# Patient Record
Sex: FEMALE | Race: WHITE
[De-identification: names, ages, dates, MRNs, and addresses within clinical notes are randomized per-mention and may not be internally consistent; named-entity substitution may affect disease eponyms.]

---

## 2016-11-16 VITALS — DIASTOLIC BLOOD PRESSURE: 95 MMHG | HEART RATE: 89 BPM | SYSTOLIC BLOOD PRESSURE: 165 MMHG

## 2017-05-13 ENCOUNTER — HOSPITAL ENCOUNTER (INPATIENT)
Dept: HOSPITAL 45 - C.EDB | Age: 77
LOS: 1 days | Discharge: HOME | DRG: 392 | End: 2017-05-14
Attending: HOSPITALIST | Admitting: HOSPITALIST
Payer: COMMERCIAL

## 2017-05-13 VITALS — HEART RATE: 80 BPM | SYSTOLIC BLOOD PRESSURE: 124 MMHG | DIASTOLIC BLOOD PRESSURE: 72 MMHG

## 2017-05-13 VITALS
HEIGHT: 61 IN | BODY MASS INDEX: 35.71 KG/M2 | HEIGHT: 61 IN | BODY MASS INDEX: 35.71 KG/M2 | WEIGHT: 189.16 LBS | WEIGHT: 189.16 LBS

## 2017-05-13 VITALS
DIASTOLIC BLOOD PRESSURE: 78 MMHG | TEMPERATURE: 99.32 F | HEART RATE: 90 BPM | OXYGEN SATURATION: 94 % | SYSTOLIC BLOOD PRESSURE: 156 MMHG

## 2017-05-13 DIAGNOSIS — N28.89: ICD-10-CM

## 2017-05-13 DIAGNOSIS — A08.4: Primary | ICD-10-CM

## 2017-05-13 DIAGNOSIS — Z79.899: ICD-10-CM

## 2017-05-13 DIAGNOSIS — E83.42: ICD-10-CM

## 2017-05-13 DIAGNOSIS — K21.9: ICD-10-CM

## 2017-05-13 DIAGNOSIS — I12.9: ICD-10-CM

## 2017-05-13 DIAGNOSIS — E86.0: ICD-10-CM

## 2017-05-13 DIAGNOSIS — M19.90: ICD-10-CM

## 2017-05-13 DIAGNOSIS — N18.3: ICD-10-CM

## 2017-05-13 DIAGNOSIS — H92.02: ICD-10-CM

## 2017-05-13 LAB
ALP SERPL-CCNC: 80 U/L (ref 45–117)
ALT SERPL-CCNC: 24 U/L (ref 12–78)
ANION GAP SERPL CALC-SCNC: 10 MMOL/L (ref 3–11)
ANION GAP SERPL CALC-SCNC: 21 MMOL/L (ref 16–25)
APPEARANCE UR: CLEAR
AST SERPL-CCNC: 16 U/L (ref 15–37)
BASOPHILS # BLD: 0 K/UL (ref 0–0.2)
BASOPHILS NFR BLD: 0 %
BILIRUB UR-MCNC: (no result) MG/DL
BUN SERPL-MCNC: 20 MG/DL (ref 7–18)
BUN/CREAT SERPL: 15 (ref 10–20)
CA-I BLD-SCNC: 1.18 MMOL/L (ref 1.12–1.32)
CALCIUM SERPL-MCNC: 8.8 MG/DL (ref 8.5–10.1)
CHLORIDE BLD-SCNC: 106 MEQ/L (ref 101–112)
CHLORIDE SERPL-SCNC: 109 MMOL/L (ref 98–107)
CKMB/CK RATIO: 3.3 (ref 0–3)
CO2 SERPL-SCNC: 24 MMOL/L (ref 21–32)
COLOR UR: YELLOW
COMPLETE: YES
CREAT BLD-MCNC: 1.2 MG/DL (ref 0.6–1.3)
CREAT CL PREDICTED SERPL C-G-VRATE: 37 ML/MIN
CREAT SERPL-MCNC: 1.3 MG/DL (ref 0.6–1.2)
EOSINOPHIL NFR BLD AUTO: 165 K/UL (ref 130–400)
GLUCOSE SERPL-MCNC: 141 MG/DL (ref 70–99)
HCT VFR BLD AUTO: 42 % (ref 37–47)
HCT VFR BLD CALC: 42.8 % (ref 37–47)
HGB BLD-MCNC: 14.3 G/DL (ref 12–16)
IG%: 0.2 %
IMM GRANULOCYTES NFR BLD AUTO: 7.5 %
INR PPP: 1 (ref 0.9–1.1)
ISTAT CARBON DIOXIDE: 21 MEQ/L (ref 24–31)
LYMPHOCYTES # BLD: 0.72 K/UL (ref 1.2–3.4)
MAGNESIUM SERPL-MCNC: 1.6 MG/DL (ref 1.8–2.4)
MANUAL MICROSCOPIC REQUIRED?: NO
MCH RBC QN AUTO: 29.8 PG (ref 25–34)
MCHC RBC AUTO-ENTMCNC: 34.3 G/DL (ref 32–36)
MCV RBC AUTO: 86.8 FL (ref 80–100)
MONOCYTES NFR BLD: 6.8 %
NEUTROPHILS # BLD AUTO: 0.1 %
NEUTROPHILS NFR BLD AUTO: 85.4 %
NITRITE UR QL STRIP: (no result)
PH UR STRIP: 5.5 [PH] (ref 4.5–7.5)
PMV BLD AUTO: 10.5 FL (ref 7.4–10.4)
POTASSIUM SERPL-SCNC: 4 MMOL/L (ref 3.5–5.1)
PROTHROMBIN TIME: 10.2 SECONDS (ref 9–12)
RBC # BLD AUTO: 4.93 M/UL (ref 4.2–5.4)
REVIEW REQ?: NO
SODIUM BLD-SCNC: 143 MEQ/L (ref 135–144)
SODIUM SERPL-SCNC: 143 MMOL/L (ref 136–145)
SP GR UR STRIP: 1.03 (ref 1–1.03)
UROBILINOGEN UR-MCNC: (no result) MG/DL
WBC # BLD AUTO: 9.66 K/UL (ref 4.8–10.8)
ZZURINE CULT IF INDIC CATH: NO

## 2017-05-13 RX ADMIN — SODIUM CHLORIDE SCH MLS/HR: 900 INJECTION, SOLUTION INTRAVENOUS at 17:22

## 2017-05-13 RX ADMIN — MAGNESIUM SULFATE IN DEXTROSE SCH GM: 10 INJECTION, SOLUTION INTRAVENOUS at 18:13

## 2017-05-13 RX ADMIN — Medication SCH MG: at 19:55

## 2017-05-13 RX ADMIN — MAGNESIUM SULFATE IN DEXTROSE SCH GM: 10 INJECTION, SOLUTION INTRAVENOUS at 16:15

## 2017-05-13 RX ADMIN — METRONIDAZOLE SCH MLS/HR: 500 INJECTION, SOLUTION INTRAVENOUS at 18:10

## 2017-05-13 NOTE — DIAGNOSTIC IMAGING REPORT
ABDOMEN AND PELVIS CT WITH IV CONTRAST



CT DOSE: 782.00 mGy.cm



HISTORY:      Left lower quadrant abdominal pain.



TECHNIQUE: Multiaxial CT images of the abdomen and pelvis were performed

following the use of intravenous contrast.



COMPARISON STUDY: Abdomen and pelvis CT 11/3/2013.



FINDINGS: Linear density lung bases suggestive of subsegmental atelectasis.

There are also a few groundglass densities within the lung bases which may be

due to mild air trapping. No pneumoperitoneum. No pneumatosis. The liver,

spleen, adrenal glands, and pancreas are unremarkable. No retroperitoneal

lymphadenopathy. There are few bilateral renal hypodense lesions. Majority of

these favor cysts. However there is a 13 mm heterogeneous lesion within the left

kidney on image 157 which does not clearly represent a simple cyst. This may

demonstrate enhancement and is concerning for a solid renal mass. Normal

gallbladder. The uterus, bilateral adnexa, and bladder are unremarkable. Colonic

diverticulosis. The colon and small bowel are decompressed which results in

suboptimal evaluation. Normal appendix. No bowel obstruction. Questionable

thickening and engorgement of the mesenteric vessels within the small bowel

loops most pronounced within the right lower quadrant. However, this could be

due to the underdistention.



IMPRESSION: 



1. No evidence for bowel obstruction. Question wall thickening within the small

bowel loops within the right side of the abdomen and lower quadrant could be due

to underdistention. However, a low-grade enteritis could also have a similar

appearance. 

2. Colonic diverticulosis.

3. Normal appendix.

4. A 13 mm lesion within the left kidney which is concerning for a solid renal

mass. Dedicated nonemergent renal CT or renal MRI is recommended for further

evaluation. 







Electronically signed by:  Sterling Kasper M.D.

5/13/2017 1:36 PM



Dictated Date/Time:  5/13/2017 1:25 PM

## 2017-05-13 NOTE — HISTORY AND PHYSICAL
History & Physical


Date & Time of Service:


May 13, 2017 at 16:00


Chief Complaint:


Abd Pain, Cold, Chills, N/V/D


Primary Care Physician:


Adilia Byrd M.D.


History of Present Illness


Source:  patient, family (daughter in law at bedside), clinic records


This is a 76 year old female with PMH of HTN, CKD stage III, breast CA s/p 

surgery and radiation, GERD, and other problems listed below who presents to 

the ED for gastrointestinal symptoms starting overnight. Patient states at 4 am 

this morning she developed periumbilical abdominal pain, bloating, nausea, 

vomiting, diarrhea, chills, fever of 101.8 at home. Currently pain is resolved 

but still feels bloated. Pt reports intermittent bloating in the past which 

initially improved when she was started on Prilosec 2 weeks ago. She was 

feeling well yesterday. Had pork and sauerkraut last evening. Pt reports 3 

month history of intermittent left ear discomfort with decreased hearing for 

which she has an ENT appointment later this month. The ear is not bothering her 

currently. Denies weight loss, URI symptoms, cough, SOB, chest pain, swallowing 

difficulty, reflux, GI bleeding, dysuria, frequency, urgency, calf pain, edema. 

No recent travel, sick contact, or recent antibiotics. Patient admits to 

history of left sided kidney mass for several years.





Past Medical/Surgical History


Medical Problems:


(1) Breast cancer


Permanent Comment: Status post abnormal left breast mammogram


Status post biopsy


Status post lumpectomy and sentinel lymph node biopsy 01/08/2013


Stage [id number]


Estrogen receptor negative, progesterone receptor positive, and HER-2/yohannes 

negative


Status post completion of radiation therapy 05/06/2013 received 5930 cGy





Status: Resolved  





(2) GERD (gastroesophageal reflux disease)


Status: Chronic  





(3) HTN (hypertension)


Status: Chronic  





(4) Osteoarthritis


Status: Chronic  





(5) PUD (peptic ulcer disease)


Permanent Comment: hx duodenal ulcer


Status: Chronic  





(6) Pyelonephritis


Status: Resolved  





(7) Stage I breast cancer


Permanent Comment: Abnormal right breast mammogram


Status post biopsy followed by lumpectomy and sentinel lymph node biopsy


Stage [id number]


Estrogen receptor negative progesterone receptor negative HER-2/yohannes negative


Status post completion of radiation therapy 04/22/2015 received 6120 cGy


Status: Resolved  





Surgical Problems:


(1) H/O colonoscopy


Permanent Comment: 8/2013- diverticulosis


Status: Chronic  





(2) H/O esophagogastroduodenoscopy


Permanent Comment: 3/25/2008- normal


8/2013-small hiatal hernia


Status: Chronic  





(3) History of carpal tunnel surgery


Status: Chronic  





(4) History of lumpectomy of left breast


Status: Chronic  





(5) S/P tubal ligation


Status: Chronic  





(6) Status post hysteroscopic polypectomy


Status: Chronic  





(7) Status post partial mastectomy of right breast


Status: Chronic  

















Family History





Diabetes mellitus


FHx: cancer


FHx: heart disease


Hypertension





Social History


Smoking Status:  Never Smoker


Alcohol Use:  occasionally (rare )


Drug Use:  none


Marital Status:  single


Housing status:  lives alone


Occupational Status:  retired





Multi-Drug Resistant Organisms


History of MDRO:  No





Allergies


Coded Allergies:  


     Ciprofloxacin (Verified  Allergy, Intermediate, HIVES, 5/13/17)


     Penicillins (Unverified  Allergy, Mild, RASH, 5/13/17)


Uncoded Allergies:  


     dermabond tape (Allergy, Severe, rash severe skin breakdown, 2/7/13)





Home Medications


Scheduled


Amlodipine Besylate (Norvasc), 10 MG PO DAILY


Calcium Carbonate (Tums), 1,000 MG PO DAILY


Cholecalciferol (D-5000), 1 TAB PO DAILY


Fish Oil ([place]-3), 1 CAP PO DAILY


Fluticasone Propionate (Nasal) (Flonase Allergy Relief), 2 SPRAYS MARY DAILY


Magnesium Oxide (Magnesium), 500 MG PO DAILY


Omeprazole (Prilosec), 20 MG PO DAILY


Turmeric (Curcuma Longa) (Turmeric), 1,500 MG PO DAILY


Vitamin B Complex (Vitamin B Complex), 1 TAB PO DAILY





Scheduled PRN


Lorazepam (Ativan), 0.5-1 MG PO HS PRN for Sleep


Oxymetazoline Hcl (Afrin), 2 SPRAYS MARY BID PRN for Nasal Congestion


Tramadol (Ultram), 50 MG PO Q6 PRN for Pain





Review of Systems


Ten systems reviewed and negative except as noted in HPI.





Physical Exam


Vital Signs











  Date Time  Temp Pulse Resp B/P Pulse Ox O2 Delivery O2 Flow Rate FiO2


 


5/13/17 15:42 37.7 88 16 121/65 94 Room Air  


 


5/13/17 14:16  92 18 135/63 96 Room Air  


 


5/13/17 13:28  89 18 185/81 96 Room Air  


 


5/13/17 12:27 38.8 116 20 182/87 97 Room Air  








General Appearance:  WD/WN, no apparent distress, + pertinent finding (pleasant 

alert 76 year old female, daughter in law at bedside)


Head:  normocephalic, atraumatic


Eyes:  normal inspection, PERRL, EOMI


ENT:  pharynx normal, + pertinent finding (left TM mild erythema inferiorly. no 

bulging. right TM normal. canals normal.)


Neck:  supple, trachea midline


Respiratory/Chest:  lungs clear, normal breath sounds, no respiratory distress, 

no accessory muscle use


Cardiovascular:  regular rate, rhythm, no murmur


Abdomen/GI:  normal bowel sounds, soft, + pertinent finding (mildly tender in 

periumbilical area, no guarding)


Extremities/Musculoskelatal:  no calf tenderness, no pedal edema


Neurologic/Psych:  alert, normal mood/affect, oriented x 3


Skin:  normal color, warm/dry





Diagnostics


Laboratory Results





Results Past 24 Hours








Test


  5/13/17


12:40 5/13/17


12:54 5/13/17


12:57 5/13/17


13:44 Range/Units


 


 


White Blood Count 9.66    4.8-10.8  K/uL


 


Red Blood Count 4.93    4.2-5.4  M/uL


 


Hemoglobin 14.7    12.0-16.0  g/dL


 


Hematocrit 42.8    37-47  %


 


Mean Corpuscular Volume 86.8      fL


 


Mean Corpuscular Hemoglobin 29.8    25-34  pg


 


Mean Corpuscular Hemoglobin


Concent 34.3


  


  


  


  32-36  g/dl


 


 


Platelet Count 165    130-400  K/uL


 


Mean Platelet Volume 10.5    7.4-10.4  fL


 


Neutrophils (%) (Auto) 85.4     %


 


Lymphocytes (%) (Auto) 7.5     %


 


Monocytes (%) (Auto) 6.8     %


 


Eosinophils (%) (Auto) 0.1     %


 


Basophils (%) (Auto) 0.0     %


 


Neutrophils # (Auto) 8.25    1.4-6.5  K/uL


 


Lymphocytes # (Auto) 0.72    1.2-3.4  K/uL


 


Monocytes # (Auto) 0.66    0.11-0.59  K/uL


 


Eosinophils # (Auto) 0.01    0-0.5  K/uL


 


Basophils # (Auto) 0.00    0-0.2  K/uL


 


RDW Standard Deviation 44.8    36.4-46.3  fL


 


RDW Coefficient of Variation 14.1    11.5-14.5  %


 


Immature Granulocyte % (Auto) 0.2     %


 


Immature Granulocyte # (Auto) 0.02    0.00-0.02  K/uL


 


Prothrombin Time


  10.2


  


  


  


  9.0-12.0


SECONDS


 


Prothromb Time International


Ratio 1.0


  


  


  


  0.9-1.1  


 


 


Sodium Level 143    136-145  mmol/L


 


Potassium Level 4.0    3.5-5.1  mmol/L


 


Chloride Level 109      mmol/L


 


Carbon Dioxide Level 24    21-32  mmol/L


 


Anion Gap 10.0  21.0  16-25  mmol/L


 


Blood Urea Nitrogen 20    7-18  mg/dl


 


Creatinine


  1.30


  


  


  


  0.60-1.20


mg/dl


 


Est Creatinine Clear Calc


Drug Dose 37.0


  


  


  


   ml/min


 


 


Estimated GFR (


American) 46.2


  


  


  


   


 


 


Estimated GFR (Non-


American 39.8


  


  


  


   


 


 


BUN/Creatinine Ratio 15.0    10-20  


 


Random Glucose 141    70-99  mg/dl


 


Calcium Level 8.8    8.5-10.1  mg/dl


 


Magnesium Level 1.6    1.8-2.4  mg/dl


 


Total Bilirubin 0.5    0.2-1  mg/dl


 


Direct Bilirubin 0.1    0-0.2  mg/dl


 


Aspartate Amino Transf


(AST/SGOT) 16


  


  


  


  15-37  U/L


 


 


Alanine Aminotransferase


(ALT/SGPT) 24


  


  


  


  12-78  U/L


 


 


Alkaline Phosphatase 80      U/L


 


Total Creatine Kinase 89      U/L


 


Creatine Kinase MB 2.9    0.5-3.6  ng/ml


 


Creatine Kinase MB Ratio 3.3    0-3.0  


 


Troponin I < 0.015    0-0.045  ng/ml


 


Total Protein 7.3    6.4-8.2  gm/dl


 


Albumin 4.1    3.4-5.0  gm/dl


 


Bedside Lactic Acid Venous


  


  1.62


  


  


  0.90-1.70


mmol/L


 


Bedside Hemoglobin   14.3  12.0-16.0  g/dl


 


Bedside Hematocrit   42  37-47  %


 


Bedside Sodium   143  135-144  mEq/L


 


Bedside Potassium   4.0  3.3-5.0  mEq/L


 


Bedside Chloride   106  101-112  mEq/L


 


Bedside Total CO2   21  24-31  mEq/l


 


Bedside Blood Urea Nitrogen   21  7-18  mg/dl


 


Bedside Creatinine   1.2  0.6-1.3  mg/dl


 


Bedside Glucose (other)   146  70-99  mg/dl


 


Bedside Ionized Calcium (Tre)


  


  


  1.18


  


  1.12-1.32


mmol/l


 


Urine Color    YELLOW  


 


Urine Appearance    CLEAR CLEAR  


 


Urine pH    5.5 4.5-7.5  


 


Urine Specific Gravity    1.029 1.000-1.030  


 


Urine Protein    NEG NEG  


 


Urine Glucose (UA)    NEG NEG  


 


Urine Ketones    NEG NEG  


 


Urine Occult Blood    NEG NEG  


 


Urine Nitrite    NEG NEG  


 


Urine Bilirubin    NEG NEG  


 


Urine Urobilinogen    NEG NEG  


 


Urine Leukocyte Esterase    NEG NEG  


 


Urine WBC (Auto)    1-5 0-5  /hpf


 


Urine RBC (Auto)    0-4 0-4  /hpf


 


Urine Hyaline Casts (Auto)    0 0-5  /lpf


 


Urine Epithelial Cells (Auto)    10-20 0-5  /lpf


 


Urine Bacteria (Auto)    NEG NEG  








 Microbiology Results


5/13/17 Blood Culture, Received


          Pending


5/13/17 Blood Culture, Received


          Pending





Diagnostic Radiology


CHEST ONE VIEW PORTABLE





HISTORY:      fever





COMPARISON: Chest 3/27/2013.





FINDINGS: Linear density at the left lung base favor subsegmental atelectasis or


scarring. No focal lung consolidations to suggest pneumonia. No pleural


effusions. No pneumothorax. The heart is mildly enlarged.





IMPRESSION:


Mild cardiomegaly. No focal lung consolidations to suggest pneumonia.





********************************************************************************

************


ABDOMEN AND PELVIS CT WITH IV CONTRAST





CT DOSE: 782.00 mGy.cm





HISTORY:      Left lower quadrant abdominal pain.





TECHNIQUE: Multiaxial CT images of the abdomen and pelvis were performed


following the use of intravenous contrast.





COMPARISON STUDY: Abdomen and pelvis CT 11/3/2013.





FINDINGS: Linear density lung bases suggestive of subsegmental atelectasis.


There are also a few groundglass densities within the lung bases which may be


due to mild air trapping. No pneumoperitoneum. No pneumatosis. The liver,


spleen, adrenal glands, and pancreas are unremarkable. No retroperitoneal


lymphadenopathy. There are few bilateral renal hypodense lesions. Majority of


these favor cysts. However there is a 13 mm heterogeneous lesion within the left


kidney on image 157 which does not clearly represent a simple cyst. This may


demonstrate enhancement and is concerning for a solid renal mass. Normal


gallbladder. The uterus, bilateral adnexa, and bladder are unremarkable. Colonic


diverticulosis. The colon and small bowel are decompressed which results in


suboptimal evaluation. Normal appendix. No bowel obstruction. Questionable


thickening and engorgement of the mesenteric vessels within the small bowel


loops most pronounced within the right lower quadrant. However, this could be


due to the underdistention.





IMPRESSION: 





1. No evidence for bowel obstruction. Question wall thickening within the small


bowel loops within the right side of the abdomen and lower quadrant could be due


to underdistention. However, a low-grade enteritis could also have a similar


appearance. 


2. Colonic diverticulosis.


3. Normal appendix.


4. A 13 mm lesion within the left kidney which is concerning for a solid renal


mass. Dedicated nonemergent renal CT or renal MRI is recommended for further


evaluation.





EKG


NSR, 96 bpm, LAD, non-specific intra-ventricular conduction delay, when 

compared to prior EKG LBBB is no longer present, as confirmed by cardiology read





Impression


Assessment and Plan





ACUTE GASTROENTERITIS


Presents with fever 38.8, tachycardia which resolved with IVF's- likely due to 

dehydration; does not appear to be septic- no leukocytosis, no hypotension, 

lactic acid WNL


CT a/p- no evidence of obstruction, ? small bowel wall thickening 

underdistention vs. low grade enteritis, colonic diverticulosis, normal appendix

, 13 mm L renal lesion


Given dose of cefepime in ER


Will continue empiric antibiotics with Rocephin and Flagyl


Check stool culture, WBC smear, C diff toxin


IVF's


PRN Zofran 


Clear liquid diet


Consider GI consult if no improvement





HYPOMAGNESEMIA


IV replacement ordered


Monitor





HYPERTENSION


BP is intermittently elevated- now improved to 120s systolic


Continue amlodipine 10 mg daily





CKD STAGE III


Creat 1.3- stable from baseline


Monitor renal function





LEFT KIDNEY LESION


Noted on CT; patient reports known left kidney mass for several years


CT a/p- A 13 mm lesion within the left kidney which is concerning for a solid 

renal mass. Dedicated nonemergent renal CT or renal MRI is recommended for 

further evaluation. 


Follow up with PCP for further imaging as outpatient





LEFT EAR PAIN


F/u with ENT as outpatient





GERD


Continue PPI





DVT PROPHYLAXIS


Lovenox SQ





CODE STATUS


Full code per my discussion with the patient





DISPOSITION


Lives alone


Follows with Dr. Adilia Byrd for primary care





Patient seen in collaboration with Dr. Alford. Please see his addendum. 








Attending Note:





Patient is a 76 yr female with multiple comorbidities presents with history of 

abdominal discomfort, nausea, vomiting, diarrhea, fever, chills since one day 

duration. She states she had similar symptoms since many months. Last 

colonoscopy was 6 yrs ago. Denies any blood in stools. Also reports 

intermittent bloating for which she was taking Prilosec since 2 weeks and 

states having peptic ulcer disease in her 20s.  CT abdomen showed no acute 

findings.  





Physical Exam:


General Appearance:Moderately built and nourished, no apparent distress


Head:  normocephalic, Atraumatic 


Eyes:  normal inspection, EOMI, PERRL


Neck:  supple, Trachea midline


Respiratory/Chest: Normal breath sounds, CTA


Cardiovascular: S1, S2, No murmur


Abdomen/GI:Soft, Non tender, Bowel sounds present


Extremities/Musculoskelatal:normal inspection, Trace edema 


Neurologic/Psych:AAOX3, grossly no focal neurological deficits 


Skin:normal color,warm 








Assessment and Plan:





GI symptoms Likely Viral Gastroenteritis


Check stool studies


IV fluids


Clear liquid diet, advance as tolerated


Continue empiric antibiotics


Consider GI for possible colonoscopy if no resolution.   





I personally reviewed the record. Patient is interviewed and examined at 

bedside. Patient's care is coordinated with Jeanie Ocasio PA-C. Please refer 

to the documentation above for details of patient's presentation and for 

discussion of other issues.





VTE Prophylaxis


VTE Risk Assessment Done? Y/N:  Yes


Risk Level:  Moderate

## 2017-05-13 NOTE — EMERGENCY ROOM VISIT NOTE
History


Report prepared by [place]:  Carlos Norris


Under the Supervision of:  Dr. Alex Ye D.O.


First contact with patient:  12:32


Chief Complaint:  ABDOMINAL PAIN


Stated Complaint:  ABD PAIN, COLD, CHILLS, N/V/D





History of Present Illness


The patient is a 76 year old female who presents to the Emergency Room with 

complaints of increased abdominal pain at 0400 this morning. She has had 

recurrent abdominal pain and bloating for several weeks, which seemed to be 

initially improving after starting Prilosec. This morning at approximately 0400 

she began to experience increased abdominal pain, currently rated 6/10 in 

severity. This morning she also started to experience chills, nausea, vomiting, 

and diarrhea. She did not measure her temperature for a fever. The patient ate 

alone last night and nobody shared the same food. She still has her gallbladder 

and appendix. Patient denies headache, change in vision, cough, sore throat, 

rhinorrhea, chest pain, shortness of breath, pain with urination, and melena.  

She does admit to an earache which has been present for the past 4 months 

waxing and waning but fairly consistent.  She has been unable to eat or drink 

anything since this started.  She does live at home alone.





   Source of History:  patient


   Onset:  0400 this morning


   Position:  abdomen


   Symptom Intensity:  6/10


   Timing:  other (increased)


   Associated Symptoms:  + chills, + diarrhea, + nausea, + vomiting, No SOB, No 

chest pain, No cough, No fevers, No headache, No melena, No sorethroat, No 

urinary symptoms





Review of Systems


See HPI for pertinent positives & negatives. A total of 10 systems reviewed and 

were otherwise negative.





Past Medical & Surgical


Medical Problems:


(1) Breast cancer


(2) Pyelonephritis


(3) Stage I breast cancer








Family History





Diabetes mellitus


FHx: cancer


FHx: heart disease


Hypertension





Social History


Smoking Status:  Never Smoker


Alcohol Use:  none


Drug Use:  none


Marital Status:  single


Housing Status:  lives alone


Occupation Status:  retired





Current/Historical Medications


Scheduled


Amlodipine Besylate (Norvasc), 10 MG PO DAILY


Calcium Carbonate (Calcium), 1,200 MG PO DAILY


Cholecalciferol (Vitamin D), 2,000 INTER.UNIT PO DAILY


Fish Oil ([place]-3), 1 CAP PO DAILY


Magnesium Oxide (Mg Supplement (Magnesium), 250 MG PO DAILY


Omeprazole (Prilosec), 20 MG PO DAILY


Probiotic Product (Probiotic), 1 CAP PO DAILY


Vitamin B Complex (Vitamin B Complex), 1 TAB PO DAILY





Allergies


Coded Allergies:  


     Ciprofloxacin (Verified  Allergy, Intermediate, HIVES, 5/13/17)


     Penicillins (Unverified  Allergy, Mild, RASH, 5/13/17)


Uncoded Allergies:  


     dermabond tape (Allergy, Severe, rash severe skin breakdown, 2/7/13)





Physical Exam


Vital Signs











  Date Time  Temp Pulse Resp B/P Pulse Ox O2 Delivery O2 Flow Rate FiO2


 


5/13/17 14:16  92 18 135/63 96 Room Air  


 


5/13/17 13:28  89 18 185/81 96 Room Air  


 


5/13/17 12:27 38.8 116 20 182/87 97 Room Air  











Physical Exam


GENERAL: Sitting up in bed, ill appearing, mild distress.


EYE EXAM: normal conjunctiva.


EARS: Mild erythema of left TM with blood on the inferior portion of the TM, 

right TM normal.


OROPHARYNX: no exudate, no erythema, lips, buccal mucosa, and tongue normal and 

mucous membranes are moist


NECK: supple, no nuchal rigidity, no adenopathy, non-tender


LUNGS: Clear to auscultation. Normal chest wall mechanics


HEART: no murmurs, S1 normal and S2 normal 


ABDOMEN: abdomen soft, tender to palpation periumbilically, normo-active bowel 

sounds, no masses, no rebound or guarding. 


BACK: Back is symmetrical on inspection and there is no deformity, no midline 

tenderness, no CVA tenderness. 


SKIN: no rashes and no bruising 


UPPER EXTREMITIES: upper extremities are grossly normal. 


LOWER EXTREMITIES: No pitting edema.


NEURO EXAM: Normal sensorium, cranial nerves II-XII grossly intact, normal 

speech,  no gross weakness of arms, no gross weakness of legs. Gross sensation 

intact.





Medical Decision & Procedures


ER Provider


Diagnostic Interpretation:


Radiology results as stated below per my review and the radiologist's 

interpretation: 





ABDOMEN AND PELVIS CT WITH IV CONTRAST





CT DOSE: 782.00 mGy.cm





HISTORY:      Left lower quadrant abdominal pain.





TECHNIQUE: Multiaxial CT images of the abdomen and pelvis were performed


following the use of intravenous contrast.





COMPARISON STUDY: Abdomen and pelvis CT 11/3/2013.





FINDINGS: Linear density lung bases suggestive of subsegmental atelectasis.


There are also a few groundglass densities within the lung bases which may be


due to mild air trapping. No pneumoperitoneum. No pneumatosis. The liver,


spleen, adrenal glands, and pancreas are unremarkable. No retroperitoneal


lymphadenopathy. There are few bilateral renal hypodense lesions. Majority of


these favor cysts. However there is a 13 mm heterogeneous lesion within the left


kidney on image 157 which does not clearly represent a simple cyst. This may


demonstrate enhancement and is concerning for a solid renal mass. Normal


gallbladder. The uterus, bilateral adnexa, and bladder are unremarkable. Colonic


diverticulosis. The colon and small bowel are decompressed which results in


suboptimal evaluation. Normal appendix. No bowel obstruction. Questionable


thickening and engorgement of the mesenteric vessels within the small bowel


loops most pronounced within the right lower quadrant. However, this could be


due to the underdistention.





IMPRESSION: 





1. No evidence for bowel obstruction. Question wall thickening within the small


bowel loops within the right side of the abdomen and lower quadrant could be due


to underdistention. However, a low-grade enteritis could also have a similar


appearance. 


2. Colonic diverticulosis.


3. Normal appendix.


4. A 13 mm lesion within the left kidney which is concerning for a solid renal


mass. Dedicated nonemergent renal CT or renal MRI is recommended for further


evaluation. 











Electronically signed by:  Sterling Kasper M.D.


5/13/2017 1:36 PM





Dictated Date/Time:  5/13/2017 1:25 PM








CHEST ONE VIEW PORTABLE





HISTORY:      fever





COMPARISON: Chest 3/27/2013.





FINDINGS: Linear density at the left lung base favor subsegmental atelectasis or


scarring. No focal lung consolidations to suggest pneumonia. No pleural


effusions. No pneumothorax. The heart is mildly enlarged.





IMPRESSION:


Mild cardiomegaly. No focal lung consolidations to suggest pneumonia.











Electronically signed by:  Sterling Kasper M.D.


5/13/2017 1:02 PM





Dictated Date/Time:  5/13/2017 1:01 PM





Laboratory Results


5/13/17 12:40








Red Blood Count 4.93, Mean Corpuscular Volume 86.8, Mean Corpuscular Hemoglobin 

29.8, Mean Corpuscular Hemoglobin Concent 34.3, Mean Platelet Volume 10.5, 

Neutrophils (%) (Auto) 85.4, Lymphocytes (%) (Auto) 7.5, Monocytes (%) (Auto) 

6.8, Eosinophils (%) (Auto) 0.1, Basophils (%) (Auto) 0.0, Neutrophils # (Auto) 

8.25, Lymphocytes # (Auto) 0.72, Monocytes # (Auto) 0.66, Eosinophils # (Auto) 

0.01, Basophils # (Auto) 0.00





5/13/17 12:40

















Test


  5/13/17


12:40 5/13/17


12:54 5/13/17


12:57 5/13/17


13:44


 


White Blood Count


  9.66 K/uL


(4.8-10.8) 


  


  


 


 


Red Blood Count


  4.93 M/uL


(4.2-5.4) 


  


  


 


 


Hemoglobin


  14.7 g/dL


(12.0-16.0) 


  


  


 


 


Hematocrit 42.8 % (37-47)    


 


Mean Corpuscular Volume


  86.8 fL


() 


  


  


 


 


Mean Corpuscular Hemoglobin


  29.8 pg


(25-34) 


  


  


 


 


Mean Corpuscular Hemoglobin


Concent 34.3 g/dl


(32-36) 


  


  


 


 


Platelet Count


  165 K/uL


(130-400) 


  


  


 


 


Mean Platelet Volume


  10.5 fL


(7.4-10.4) 


  


  


 


 


Neutrophils (%) (Auto) 85.4 %    


 


Lymphocytes (%) (Auto) 7.5 %    


 


Monocytes (%) (Auto) 6.8 %    


 


Eosinophils (%) (Auto) 0.1 %    


 


Basophils (%) (Auto) 0.0 %    


 


Neutrophils # (Auto)


  8.25 K/uL


(1.4-6.5) 


  


  


 


 


Lymphocytes # (Auto)


  0.72 K/uL


(1.2-3.4) 


  


  


 


 


Monocytes # (Auto)


  0.66 K/uL


(0.11-0.59) 


  


  


 


 


Eosinophils # (Auto)


  0.01 K/uL


(0-0.5) 


  


  


 


 


Basophils # (Auto)


  0.00 K/uL


(0-0.2) 


  


  


 


 


RDW Standard Deviation


  44.8 fL


(36.4-46.3) 


  


  


 


 


RDW Coefficient of Variation


  14.1 %


(11.5-14.5) 


  


  


 


 


Immature Granulocyte % (Auto) 0.2 %    


 


Immature Granulocyte # (Auto)


  0.02 K/uL


(0.00-0.02) 


  


  


 


 


Prothrombin Time


  10.2 SECONDS


(9.0-12.0) 


  


  


 


 


Prothromb Time International


Ratio 1.0 (0.9-1.1) 


  


  


  


 


 


Est Creatinine Clear Calc


Drug Dose 37.0 ml/min 


  


  


  


 


 


Estimated GFR (


American) 46.2 


  


  


  


 


 


Estimated GFR (Non-


American 39.8 


  


  


  


 


 


BUN/Creatinine Ratio 15.0 (10-20)    


 


Calcium Level


  8.8 mg/dl


(8.5-10.1) 


  


  


 


 


Magnesium Level


  1.6 mg/dl


(1.8-2.4) 


  


  


 


 


Total Bilirubin


  0.5 mg/dl


(0.2-1) 


  


  


 


 


Direct Bilirubin


  0.1 mg/dl


(0-0.2) 


  


  


 


 


Aspartate Amino Transf


(AST/SGOT) 16 U/L (15-37) 


  


  


  


 


 


Alanine Aminotransferase


(ALT/SGPT) 24 U/L (12-78) 


  


  


  


 


 


Alkaline Phosphatase


  80 U/L


() 


  


  


 


 


Total Creatine Kinase


  89 U/L


() 


  


  


 


 


Creatine Kinase MB


  2.9 ng/ml


(0.5-3.6) 


  


  


 


 


Creatine Kinase MB Ratio 3.3 (0-3.0)    


 


Troponin I


  < 0.015 ng/ml


(0-0.045) 


  


  


 


 


Total Protein


  7.3 gm/dl


(6.4-8.2) 


  


  


 


 


Albumin


  4.1 gm/dl


(3.4-5.0) 


  


  


 


 


Bedside Lactic Acid Venous


  


  1.62 mmol/L


(0.90-1.70) 


  


 


 


Bedside Hemoglobin


  


  


  14.3 g/dl


(12.0-16.0) 


 


 


Bedside Hematocrit   42 % (37-47)  


 


Bedside Sodium


  


  


  143 mEq/L


(135-144) 


 


 


Bedside Potassium


  


  


  4.0 mEq/L


(3.3-5.0) 


 


 


Bedside Chloride


  


  


  106 mEq/L


(101-112) 


 


 


Bedside Total CO2


  


  


  21 mEq/l


(24-31) 


 


 


Anion Gap


  


  


  21.0 mmol/L


(16-25) 


 


 


Bedside Blood Urea Nitrogen


  


  


  21 mg/dl


(7-18) 


 


 


Bedside Creatinine


  


  


  1.2 mg/dl


(0.6-1.3) 


 


 


Bedside Glucose (other)


  


  


  146 mg/dl


(70-99) 


 


 


Bedside Ionized Calcium (Tre)


  


  


  1.18 mmol/l


(1.12-1.32) 


 


 


Urine Color    YELLOW 


 


Urine Appearance    CLEAR (CLEAR) 


 


Urine pH    5.5 (4.5-7.5) 


 


Urine Specific Gravity


  


  


  


  1.029


(1.000-1.030)


 


Urine Protein    NEG (NEG) 


 


Urine Glucose (UA)    NEG (NEG) 


 


Urine Ketones    NEG (NEG) 


 


Urine Occult Blood    NEG (NEG) 


 


Urine Nitrite    NEG (NEG) 


 


Urine Bilirubin    NEG (NEG) 


 


Urine Urobilinogen    NEG (NEG) 


 


Urine Leukocyte Esterase    NEG (NEG) 


 


Urine WBC (Auto)    1-5 /hpf (0-5) 


 


Urine RBC (Auto)    0-4 /hpf (0-4) 


 


Urine Hyaline Casts (Auto)    0 /lpf (0-5) 


 


Urine Epithelial Cells (Auto)


  


  


  


  10-20 /lpf


(0-5)


 


Urine Bacteria (Auto)    NEG (NEG) 





Laboratory results per my review.





Medications Administered











 Medications


  (Trade)  Dose


 Ordered  Sig/Norberto


 Route  Start Time


 Stop Time Status Last Admin


Dose Admin


 


 Sodium Chloride  2,000 ml @ 


 999 mls/hr  Q2H1M STAT


 IV  5/13/17 12:48


 5/13/17 14:48  5/13/17 12:48


999 MLS/HR


 


 Cefepime HCl/


 Dextrose


  (Maxipime IV/D5


 100ml)  111.3 ml @ 


 200 mls/hr  NOW  STAT


 IV  5/13/17 12:48


 5/13/17 13:21 DC 5/13/17 13:25


200 MLS/HR


 


 Ondansetron HCl


  (Zofran Inj)  4 mg  NOW  STAT


 IV  5/13/17 13:33


 5/13/17 13:34 DC 5/13/17 14:13


4 MG


 


 Acetaminophen


  (Tylenol Tab)  1,000 mg  NOW  STAT


 PO  5/13/17 13:33


 5/13/17 13:34 DC 5/13/17 14:14


1,000 MG











ECG


Indication:  abdominal pain


Rate (beats per minute):  96


Rhythm:  sinus rhythm


Findings:  1st degree AV block, LBBB, left axis deviation


Comparison ECG Date:  1 July 2014


Change:  no significant change





ED Course


ED COURSE: 


Vital signs were reviewed and showed fever, tachycardia, hypertension.


The patients medical record was reviewed


The above diagnostic studies were performed and reviewed.


ED treatments and interventions as stated above. 





1236: The patient was evaluated in room A11b. A complete history and physical 

examination was performed.





1248: Cefepime HCl 1000 mg / dextrose 111.3 ml @ 200 mls/hr, NSS 2000 ml @  999 

mls/hr.





1333:  Tylenol 1000 mg PO, Zofran 4 mg IV.





1413: Discussed the case with Verenice Ocasio PA-C, [facility] Hospitalist. The 

patient will be evaluated.





1415: Upon reevaluation, the patient is stable.I discussed my findings with the 

patient and she understands and agrees with the treatment plan.   


Based on the patients age, coexisting illnesses, exam and lab findings the 

decision to treat as an inpatient was made.


The patient remained stable while under my care.


The patient will be evaluated for further management.





Medical Decision


Differential diagnoses includes but is not limited to gastritis, peptic ulcer 

disease, GERD, gallbladder disease, pancreatitis, small bowel obstruction, 

acute coronary syndrome, pericarditis, ischemic bowel, irritable bowel disease, 

irritable bowel syndrome, appendicitis, diverticulitis, malignancy, hernia, 

urinary tract infection, torsion, perforation, trauma, infectious. 





Patient is a 76-year-old female who presents the ER for abdominal pain 

associated with nausea, vomiting and diarrhea.  Her abdominal exam is fairly 

unimpressive.  Vitals were remarkable for a temp of 39 and heart rate in the 110

's.  IV was established she is given 2 L normal saline.  CBC along with BMP, 

LFTs, bilirubin and troponin were negative.  UA was negative.  With her 

abdominal pain and fever she was sent for CT of the abdomen and pelvis which 

shows mild inflammation of the small bowel which is likely consistent with her 

gastroenteritis.  I do not believe that this is ischemic with a lactate of 1.7.

  Heart rate improved with fluids.  She initially declined Tylenol because she 

did not want to vomit but she was later agreeable following getting Zofran.  

She was given a dose cefepime initially with her fever.  Based on her symptoms 

I do believe this consistent with a gastroenteritis.  Her left ear is 

erythematous with blood in the inferior portion but with her pain being present 

for the past 3-4 months I favor that this is not the likely cause of her 

fevers.  Discussed the case with internal medicine, patient will be evaluated 

for further workup.





Consults


Time Called:  1400


Consulting Physician:  [facility]ice [facility]cas[facility]-C, [facility]


Returned Call:  1413


The patient will be evaluated.





Impression





 Primary Impression:  


 Acute gastroenteritis


 Additional Impression:  


 Otitis





Scribe Attestation


The scribe's documentation has been prepared under my direction and personally 

reviewed by me in its entirety. I confirm that the note above accurately 

reflects all work, treatment, procedures, and medical decision making performed 

by me.





Departure Information


Dispostion


Being Evaluated By Hospitalist





Referrals


Adilia Byrd M.D. (PCP)





Patient Instructions


My [facility]





Problem Qualifiers








 Additional Impression:  


 Otitis


 Laterality:  left  Qualified Codes:  H66.92 - Otitis media, unspecified, left 

ear

## 2017-05-13 NOTE — DIAGNOSTIC IMAGING REPORT
CHEST ONE VIEW PORTABLE



HISTORY:      fever



COMPARISON: Chest 3/27/2013.



FINDINGS: Linear density at the left lung base favor subsegmental atelectasis or

scarring. No focal lung consolidations to suggest pneumonia. No pleural

effusions. No pneumothorax. The heart is mildly enlarged.



IMPRESSION:

Mild cardiomegaly. No focal lung consolidations to suggest pneumonia.







Electronically signed by:  Sterling Kasper M.D.

5/13/2017 1:02 PM



Dictated Date/Time:  5/13/2017 1:01 PM

## 2017-05-14 VITALS
SYSTOLIC BLOOD PRESSURE: 133 MMHG | OXYGEN SATURATION: 96 % | TEMPERATURE: 99.68 F | DIASTOLIC BLOOD PRESSURE: 71 MMHG | HEART RATE: 78 BPM

## 2017-05-14 VITALS
DIASTOLIC BLOOD PRESSURE: 72 MMHG | OXYGEN SATURATION: 95 % | TEMPERATURE: 98.24 F | SYSTOLIC BLOOD PRESSURE: 129 MMHG | HEART RATE: 84 BPM

## 2017-05-14 VITALS
OXYGEN SATURATION: 95 % | HEART RATE: 84 BPM | SYSTOLIC BLOOD PRESSURE: 129 MMHG | DIASTOLIC BLOOD PRESSURE: 72 MMHG | TEMPERATURE: 98.24 F

## 2017-05-14 LAB
ANION GAP SERPL CALC-SCNC: 6 MMOL/L (ref 3–11)
BUN SERPL-MCNC: 17 MG/DL (ref 7–18)
BUN/CREAT SERPL: 13.8 (ref 10–20)
CALCIUM SERPL-MCNC: 7.7 MG/DL (ref 8.5–10.1)
CHLORIDE SERPL-SCNC: 114 MMOL/L (ref 98–107)
CO2 SERPL-SCNC: 24 MMOL/L (ref 21–32)
CREAT CL PREDICTED SERPL C-G-VRATE: 39.7 ML/MIN
CREAT SERPL-MCNC: 1.2 MG/DL (ref 0.6–1.2)
GLUCOSE SERPL-MCNC: 100 MG/DL (ref 70–99)
MAGNESIUM SERPL-MCNC: 2.2 MG/DL (ref 1.8–2.4)
POTASSIUM SERPL-SCNC: 3.7 MMOL/L (ref 3.5–5.1)
SODIUM SERPL-SCNC: 144 MMOL/L (ref 136–145)

## 2017-05-14 RX ADMIN — METRONIDAZOLE SCH MLS/HR: 500 INJECTION, SOLUTION INTRAVENOUS at 02:36

## 2017-05-14 RX ADMIN — METRONIDAZOLE SCH MLS/HR: 500 INJECTION, SOLUTION INTRAVENOUS at 10:31

## 2017-05-14 RX ADMIN — SODIUM CHLORIDE SCH MLS/HR: 900 INJECTION, SOLUTION INTRAVENOUS at 02:35

## 2017-05-14 RX ADMIN — SODIUM CHLORIDE SCH MLS/HR: 900 INJECTION, SOLUTION INTRAVENOUS at 14:17

## 2017-05-14 RX ADMIN — Medication SCH MG: at 08:37

## 2017-05-14 NOTE — DISCHARGE INSTRUCTIONS
Discharge Instructions


Date of Service


May 14, 2017.





Admission


Reason for Admission:  Acute Gastroenteritis





Discharge


Discharge Diagnosis / Problem:  Acute Gastroenteritis





Discharge Goals


Goal(s):  Decrease discomfort, Improve function





Activity Recommendations


Activity Limitations:  resume your previous activity


Exercise/Sports Limitations:  as tolerated





.





Instructions / Follow-Up


Instructions / Follow-Up


Follows with Dr. Adilia Byrd on 5/16/17 at 11:20AM





Consider Follow up with your Gastroenterologist for possible colonoscopy as 

outpatient as advised 


Seek immediate medical attention if your symptoms reoccur or worsen





Current Hospital Diet


Patient's current hospital diet: Regular Diet





Discharge Diet


Recommended Diet:  Regular Diet





Pending Studies


Studies pending at discharge:  yes


List of pending studies:  


Stool Studies





Medical Emergencies








.


Who to Call and When:





Medical Emergencies:  If at any time you feel your situation is an emergency, 

please call 911 immediately.





.





Non-Emergent Contact


Non-Emergency issues call your:  Primary Care Provider


Call Non-Emergent contact if:  you have a fever, your pain is not controlled, 

your pain is worsening, your pain is unusual for you, you have any medication 

questions





.


.





"Provider Documentation" section prepared by Qasim Alford.








.





VTE Core Measure


Inpt VTE Proph given/why not?:  Enoxaparin (Lovenox)SQ

## 2017-05-14 NOTE — DISCHARGE SUMMARY
Discharge Summary


Date of Service


May 14, 2017.





Discharge Summary


Admission Date:


May 13, 2017 at 15:38


Discharge Date:  May 14, 2017


Discharge Disposition:  Home


Principal Diagnosis:


Acute Gastroenteritis


Procedures:


CT ABD:


1. No evidence for bowel obstruction. Question wall thickening within the small


bowel loops within the right side of the abdomen and lower quadrant could be due


to underdistention. However, a low-grade enteritis could also have a similar


appearance. 


2. Colonic diverticulosis.


3. Normal appendix.


4. A 13 mm lesion within the left kidney which is concerning for a solid renal


mass. Dedicated nonemergent renal CT or renal MRI is recommended for further


evaluation. 








CXR:


Mild cardiomegaly. No focal lung consolidations to suggest pneumonia.


Consultations:


None


Pending Studies/Follow-Up:


Follows with Dr. Adilia Byrd on 5/16/17 at 11:20AM





Consider Follow up with your Gastroenterologist for possible colonoscopy as 

outpatient as advised 


Seek immediate medical attention if your symptoms reoccur or worsen





Medication Reconciliation


New Medications:  


Cefuroxime Axetil (Ceftin) 500 Mg Tab


500 MG PO BID for 5 Days, #10 TAB





Metronidazole (Flagyl) 500 Mg Tab


500 MG PO TID for 5 Days, #15 TAB





 


Continued Medications:  


Amlodipine Besylate (Norvasc) 10 Mg Tab


10 MG PO DAILY, TAB





Calcium Carbonate (Tums) 500 Mg Chew


1000 MG PO DAILY





Cholecalciferol (D-5000) 5,000 Unit Tab


1 TAB PO DAILY for 30 Days, #30 TAB





Fish Oil (Omega-3) 1 Ea Cap


1 CAP PO DAILY, CAP





Fluticasone Propionate (Nasal) (Flonase Allergy Relief) 50 Mcg/Act Spr


2 SPRAYS MARY DAILY





Lorazepam (Ativan) 0.5 Mg Tab


0.5-1 MG PO HS PRN for Sleep, TAB


Take 1-2 tabs by mouth at bedtime as needed for sleep.


Magnesium Oxide (Magnesium) 500 Mg Cap


500 MG PO DAILY





Omeprazole (Prilosec) 20 Mg Capcr


20 MG PO DAILY, CAP





Oxymetazoline Hcl (Afrin) 0.05 % Spr


2 SPRAYS MARY BID PRN for Nasal Congestion





Tramadol (Ultram) 50 Mg Tab


50 MG PO Q6 PRN for Pain, TAB





Turmeric (Curcuma Longa) (Turmeric) 500 Mg Tab


1500 MG PO DAILY





Vitamin B Complex (Vitamin B Complex) 1 Tab Tab


1 TAB PO DAILY











Admission Information


HPI (per Admitting provider):


This is a 76 year old female with PMH of HTN, CKD stage III, breast CA s/p 

surgery and radiation, GERD, and other problems listed below who presents to 

the ED for gastrointestinal symptoms starting overnight. Patient states at 4 am 

this morning she developed periumbilical abdominal pain, bloating, nausea, 

vomiting, diarrhea, chills, fever of 101.8 at home. Currently pain is resolved 

but still feels bloated. Pt reports intermittent bloating in the past which 

initially improved when she was started on Prilosec 2 weeks ago. She was 

feeling well yesterday. Had pork and sauerkraut last evening. Pt reports 3 

month history of intermittent left ear discomfort with decreased hearing for 

which she has an ENT appointment later this month. The ear is not bothering her 

currently. Denies weight loss, URI symptoms, cough, SOB, chest pain, swallowing 

difficulty, reflux, GI bleeding, dysuria, frequency, urgency, calf pain, edema. 

No recent travel, sick contact, or recent antibiotics. Patient admits to 

history of left sided kidney mass for several years.


Physical Exam (per Admitting):  


   General Appearance:  WD/WN, no apparent distress, + pertinent finding (

pleasant alert 76 year old female, daughter in law at bedside)


   Head:  normocephalic, atraumatic


   Eyes:  normal inspection, PERRL, EOMI


   ENT:  pharynx normal, + pertinent finding (left TM mild erythema inferiorly. 

no bulging. right TM normal. canals normal.)


   Neck:  supple, trachea midline


   Respiratory/Chest:  lungs clear, normal breath sounds, no respiratory 

distress, no accessory muscle use


   Cardiovascular:  regular rate, rhythm, no murmur


   Abdomen/GI:  normal bowel sounds, soft, + pertinent finding (mildly tender 

in periumbilical area, no guarding)


   Extremities/Musculoskelatal:  no calf tenderness, no pedal edema


   Neurologic/Psych:  alert, normal mood/affect, oriented x 3


   Skin:  normal color, warm/dry





Hospital Course


ACUTE GASTROENTERITIS


Presented with fever, tachycardia, abd discomfort 


CT ABD: No evidence of obstruction, ? enteritis 


S/P IV cefepime in ER


Continue empiric antibiotics Rocephin and Flagyl


Check stool culture, WBC smear, C diff toxin:pending


S/P IVF


Tolerating diet 








HYPOMAGNESEMIA


Resolved


Monitor





HYPERTENSION


Stable


Continue amlodipine 10 mg daily





CKD STAGE III


Creat 1.3- stable from baseline


Monitor renal function





LEFT KIDNEY LESION


Noted on CT; patient reports known left kidney mass for several years


CT a/p- A 13 mm lesion within the left kidney which is concerning for a solid 

renal mass. Dedicated nonemergent renal CT or renal MRI is recommended for 

further evaluation. 


Follow up with PCP for further imaging as outpatient








LEFT EAR PAIN


F/u with ENT as outpatient








GERD


Continue PPI








DVT PROPHYLAXIS


Lovenox SQ





CODE STATUS


Full code 








DISPOSITION


Plan to discharge home today





Follows with Dr. Adilia Byrd on 5/16/17 at 11:20AM





Consider Follow up with your Gastroenterologist for possible colonoscopy as 

outpatient as advised 


Seek immediate medical attention if your symptoms reoccur or worsen


Total time spent on discharge = 


This includes examination of the patient, discharge planning, medication 

reconciliation, and communication with other providers.





Discharge Instructions


Discharge Instructions


Date of Service


May 14, 2017.





Admission


Reason for Admission:  Acute Gastroenteritis





Discharge


Discharge Diagnosis / Problem:  Acute Gastroenteritis





Discharge Goals


Goal(s):  Decrease discomfort, Improve function





Activity Recommendations


Activity Limitations:  resume your previous activity


Exercise/Sports Limitations:  as tolerated





.





Instructions / Follow-Up


Instructions / Follow-Up


Follows with Dr. Adilia Byrd on 5/16/17 at 11:20AM





Consider Follow up with your Gastroenterologist for possible colonoscopy as 

outpatient as advised 


Seek immediate medical attention if your symptoms reoccur or worsen





Current Hospital Diet


Patient's current hospital diet: Regular Diet





Discharge Diet


Recommended Diet:  Regular Diet





Pending Studies


Studies pending at discharge:  yes


List of pending studies:  


Stool Studies





Medical Emergencies








.


Who to Call and When:





Medical Emergencies:  If at any time you feel your situation is an emergency, 

please call 911 immediately.





.





Non-Emergent Contact


Non-Emergency issues call your:  Primary Care Provider


Call Non-Emergent contact if:  you have a fever, your pain is not controlled, 

your pain is worsening, your pain is unusual for you, you have any medication 

questions





.


.





"Provider Documentation" section prepared by Qasim Alford.








.





VTE Core Measure


Inpt VTE Proph given/why not?:  Enoxaparin (Lovenox)SQ

## 2017-05-14 NOTE — PROGRESS NOTE
Internal Med Progress Note


Date of Service:


May 14, 2017.


Provider Documentation:





SUBJECTIVE:





Seen and examined at bedside. States feeling well. Eager to get discharged. 

Denies nausea, vomiting, abd pain, diarrhea, fever, chills, chest pain, SOB. 


Family at bedside. Offers no complaints.  








OBJECTIVE:





Vital Signs-as noted below





Physical Exam:


General Appearance:Moderately built and nourished, no apparent distress


Head:  normocephalic, Atraumatic 


Eyes:  normal inspection, EOMI, PERRL


Neck:  supple, Trachea midline


Respiratory/Chest: Normal breath sounds, CTA


Cardiovascular: S1, S2, No murmur


Abdomen/GI:Soft, Non tender, Bowel sounds present


Extremities/Musculoskelatal:normal inspection, Trace edema 


Neurologic/Psych:AAOX3, grossly no focal neurological deficits 


Skin:normal color,warm 








Lab data as noted below.


ASSESSMENT & PLAN:


ACUTE GASTROENTERITIS


Presented with fever, tachycardia, abd discomfort 


CT ABD: No evidence of obstruction, ? enteritis 


S/P IV cefepime in ER


Continue empiric antibiotics Rocephin and Flagyl


Check stool culture, WBC smear, C diff toxin:pending


S/P IVF


Tolerating diet 








HYPOMAGNESEMIA


Resolved


Monitor





HYPERTENSION


Stable


Continue amlodipine 10 mg daily





CKD STAGE III


Creat 1.3- stable from baseline


Monitor renal function





LEFT KIDNEY LESION


Noted on CT; patient reports known left kidney mass for several years


CT a/p- A 13 mm lesion within the left kidney which is concerning for a solid 

renal mass. Dedicated nonemergent renal CT or renal MRI is recommended for 

further evaluation. 


Follow up with PCP for further imaging as outpatient








LEFT EAR PAIN


F/u with ENT as outpatient








GERD


Continue PPI








DVT PROPHYLAXIS


Lovenox SQ





CODE STATUS


Full code 








DISPOSITION


Plan to discharge home today





Follows with Dr. Adilia Byrd on 5/16/17 at 11:20AM





Consider Follow up with your Gastroenterologist for possible colonoscopy as 

outpatient as advised 


Seek immediate medical attention if your symptoms reoccur or worsen


Vital Signs:











  Date Time  Temp Pulse Resp B/P Pulse Ox O2 Delivery O2 Flow Rate FiO2


 


5/14/17 08:00      Room Air  


 


5/14/17 07:38 36.8 84 18 129/72 95 Room Air  


 


5/14/17 01:02 37.6 78 20 133/71 96 Room Air  


 


5/14/17 00:05      Room Air  


 


5/13/17 22:00      Room Air  


 


5/13/17 18:39  80  124/72    


 


5/13/17 16:30 37.4 90 18 156/78 94 Room Air  


 


5/13/17 16:30 37.4 90 18 156/78 94 Room Air  


 


5/13/17 15:42 37.7 88 16 121/65 94 Room Air  


 


5/13/17 14:16  92 18 135/63 96 Room Air  








Lab Results:





Results Past 24 Hours








Test


  5/13/17


13:44 5/14/17


05:34 Range/Units


 


 


Urine Color YELLOW   


 


Urine Appearance CLEAR  CLEAR  


 


Urine pH 5.5  4.5-7.5  


 


Urine Specific Gravity 1.029  1.000-1.030  


 


Urine Protein NEG  NEG  


 


Urine Glucose (UA) NEG  NEG  


 


Urine Ketones NEG  NEG  


 


Urine Occult Blood NEG  NEG  


 


Urine Nitrite NEG  NEG  


 


Urine Bilirubin NEG  NEG  


 


Urine Urobilinogen NEG  NEG  


 


Urine Leukocyte Esterase NEG  NEG  


 


Urine WBC (Auto) 1-5  0-5  /hpf


 


Urine RBC (Auto) 0-4  0-4  /hpf


 


Urine Hyaline Casts (Auto) 0  0-5  /lpf


 


Urine Epithelial Cells (Auto) 10-20  0-5  /lpf


 


Urine Bacteria (Auto) NEG  NEG  


 


Sodium Level  144 136-145  mmol/L


 


Potassium Level  3.7 3.5-5.1  mmol/L


 


Chloride Level  114   mmol/L


 


Carbon Dioxide Level  24 21-32  mmol/L


 


Anion Gap  6.0 3-11  mmol/L


 


Blood Urea Nitrogen  17 7-18  mg/dl


 


Creatinine


  


  1.20


  0.60-1.20


mg/dl


 


Est Creatinine Clear Calc


Drug Dose 


  39.7


   ml/min


 


 


Estimated GFR (


American) 


  50.8


   


 


 


Estimated GFR (Non-


American 


  43.9


   


 


 


BUN/Creatinine Ratio  13.8 10-20  


 


Random Glucose  100 70-99  mg/dl


 


Calcium Level  7.7 8.5-10.1  mg/dl


 


Magnesium Level  2.2 1.8-2.4  mg/dl








 Microbiology Results


5/14/17 WBC Smear, Received


          Pending


5/14/17 C.difficile Toxin B Gene (PCR), Received


          Pending


5/14/17 Shiga Toxin Test, Received


          Pending


5/14/17 Stool Culture, Received


          Pending

## 2017-07-27 ENCOUNTER — HOSPITAL ENCOUNTER (OUTPATIENT)
Dept: HOSPITAL 45 - C.MAMM | Age: 77
Discharge: HOME | End: 2017-07-27
Payer: COMMERCIAL

## 2017-07-27 DIAGNOSIS — Z85.3: ICD-10-CM

## 2017-07-27 DIAGNOSIS — Z12.31: Primary | ICD-10-CM

## 2017-07-27 NOTE — MAMMOGRAPHY REPORT
BILATERAL DIGITAL SCREENING MAMMOGRAM TOMOSYNTHESIS WITH CAD: 7/27/2017

CLINICAL HISTORY: Asymptomatic. Personal history of breast cancer.  





TECHNIQUE:  Breast tomosynthesis in addition to standard 2D mammography was performed. Current study 
was also evaluated with a Computer Aided Detection (CAD) system.  



COMPARISON: Comparison is made to exams dated:  7/25/2016 mammogram, 7/14/2015 mammogram, 10/23/2014 
mammogram, 10/22/2013 mammogram, 10/19/2012 mammogram, and 6/9/2011 mammogram - [facility].   



BREAST COMPOSITION:  There are scattered areas of fibroglandular density in both breasts.  



FINDINGS:  No suspicious masses, calcifications, or areas of architectural distortion are noted in ei
ther breast. There has been no significant interval change compared to prior exams.  There are stable
 postsurgical changes from bilateral lumpectomies.



IMPRESSION:  ACR BI-RADS CATEGORY 2: BENIGN

There is no mammographic evidence of malignancy. A 1 year screening mammogram is recommended.  The pa
tient will receive written notification of the results.  





Approximately 10% of breast cancers are not detected with mammography. A negative mammographic report
 should not delay biopsy if a clinically suggestive mass is present.



Kelly Cowan M.D.          

/:7/27/2017 15:42:21  



Imaging Technologist: Salima MELTON)(MOIRA), [facility]

letter sent: Normal 1/2  

BI-RADS Code: ACR BI-RADS Category 2: Benign

## 2017-12-20 ENCOUNTER — HOSPITAL ENCOUNTER (OUTPATIENT)
Dept: HOSPITAL 45 - C.ONC | Age: 77
Discharge: HOME | End: 2017-12-20
Attending: PHYSICIAN ASSISTANT
Payer: COMMERCIAL

## 2017-12-20 VITALS
SYSTOLIC BLOOD PRESSURE: 144 MMHG | TEMPERATURE: 98.24 F | HEART RATE: 76 BPM | DIASTOLIC BLOOD PRESSURE: 82 MMHG | OXYGEN SATURATION: 96 %

## 2017-12-20 DIAGNOSIS — Z85.3: ICD-10-CM

## 2017-12-20 DIAGNOSIS — Z08: Primary | ICD-10-CM

## 2017-12-20 DIAGNOSIS — Z92.3: ICD-10-CM

## 2017-12-20 NOTE — RADIATION ONCOLOGY FOLLOW-UP
Radiation Oncology Follow-Up


Date of Visit


Dec 20, 2017.





Reason For Visit


Annual follow-up





Radiation Completion Date


RT to right breast on 4/22/15 and to left breast  5/6/13





Diagnosis





(1) Breast cancer


Status:  Resolved        Onset Date:  11/16/2012


Histology Subtype:  ductal


Stage:  l


Permanent Comment:  Status post abnormal left breast mammogram


Status post biopsy


Status post lumpectomy and sentinel lymph node biopsy 01/08/2013


Stage [id number]


Estrogen receptor negative, progesterone receptor positive, and HER-2/yohannes 

negative


Status post completion of radiation therapy 05/06/2013 received 5930 cGy


  Last Edited By: Ingris Corea on May 20, 2015 09:06


(2) Stage I breast cancer


Status:  Resolved        Onset Date:  11/24/2014


Histology Subtype:  ductal


Stage:  l


Permanent Comment:  Abnormal right breast mammogram


Status post biopsy followed by lumpectomy and sentinel lymph node biopsy


Stage [id number]


Estrogen receptor negative progesterone receptor negative HER-2/yohannes negative


Status post completion of radiation therapy 04/22/2015 received 6120 cGy  Last 

Edited By: Ingris Corea on Nov 18, 2015 16:28





Interim History


She's been doing well over this past year.  She denies any changes to her 

breasts.  She is noted no masses or tenderness and no change of the axilla.  She

's had no swelling of her arms.  She is up-to-date on mammography.  She has 

noted in the past that drinking caffeine will cause breast tenderness.  She 

does try to refrain from drinking caffeine.  She only drinks 1 cup of coffee in 

the morning.  She has had no difficulty with breast tenderness over this past 

year.  Her daughter had undergone bilateral mastectomies.  She did have genetic 

testing.  The testing is reported as negative by the patient.





Allergies


Coded Allergies:  


     Ciprofloxacin (Verified  Allergy, Intermediate, HIVES, 5/13/17)


     Penicillins (Unverified  Allergy, Mild, RASH, 5/13/17)


Uncoded Allergies:  


     dermabond tape (Allergy, Severe, rash severe skin breakdown, 2/7/13)





Home Medications


Scheduled


Aloe Vera (Aloe Vera Juice), 1 CUP PO DAILY


Amlodipine Besylate (Norvasc), 10 MG PO DAILY


Cholecalciferol (D-5000), 1 TAB PO DAILY


Fish Oil (Whitman-3), 1 CAP PO DAILY


Magnesium Oxide (Magnesium), 1,000 MG PO DAILY


Turmeric (Curcuma Longa) (Turmeric), 1,500 MG PO DAILY


Vitamin B Complex (Vitamin B Complex), 1 TAB PO DAILY





Scheduled PRN


Fluticasone Propionate (Nasal) (Flonase Allergy Relief), 2 SPRAYS MARY DAILY PRN 

for Nasal Congestion


Lorazepam (Ativan), 0.5-1 MG PO HS PRN for Sleep





Review of Systems


Gastrointestinal:  


   Symptoms:  WNL


Oral:  


   Symptoms:  No Problems


Respiratory:  


   Symptoms:  WNL


Urinary:  


   Symptoms:  WNL


Skin:  


   Symptoms:  No Problems


Breast:  


   Right Upper Arm Measurement:  39.5


   Right Mid Arm Measurement:  27.3


   Right Wrist Measurement:  16.8


   Left Upper Arm Measurement:  28.8


   Left Mid Arm Measurement:  28.0


   Left Wrist Measurement:  16.8


   Arm Dominence:  Right


   Cosmetic Comments:  Both breasts have been treated with radiation.





Physical Exam





Vital Signs








  Date Time  Temp Pulse Resp B/P (MAP) Pulse Ox O2 Delivery O2 Flow Rate FiO2


 


12/20/17 14:36 36.8 76 16 144/82 96   








Fatigue:  None


General Appearance:  no apparent distress


Eyes:  normal inspection


ENT:  normal ENT inspection, hearing grossly normal


Neck:  no adenopathy, thyroid normal


Respiratory/Chest:  lungs clear, no respiratory distress, no accessory muscle 

use


Breast:


Bilateral breast examination reveals well-healed incisions.  There are no 

masses or tenderness and no axillary adenopathy.  She has no skin retractions 

or breast edema.  There are no nipple retractions.  Using the [place] score 

cosmesis she has a in excellent outcome.


Cardiovascular:  regular rate, rhythm, no gallop, no murmur


Abdomen:  non tender, soft


Extremities:  no pedal edema


Neurologic/Psychiatric:  no motor/sensory deficits, alert, normal mood/affect


Skin:  warm/dry


Lymphatic:  no adenopathy





Pain Management


Patient Reports Pain:  Yes


Side:  Mid


Pain Location:  Back


Patient Preferred Pain Scale:  0 - 10


Initial Pain Intensity:  2.5


Pain Management Plan


The pain is related to foot discomfort.  She did not require any prescriptive 

or over-the-counter pain medications.





Laboratory


Laboratory Results:  not applicable





Pathology


Pathology Results:  not applicable





Imaging


Imaging Studies:  were reviewed, and pertinent findings noted below


Imaging Comments


 











Patient: CONY HECTOR 


[facility] Rec: [id number] Address1:  [address] [place] [address]


Address2:   


 


[id number]


YOB: 1940    Sex: F


Ref Phy: Adilia Byrd M.D.


Att Phy:  Malcolm Byrd M.D.


Graciela Phy:  Adilia Byrd M.D.


Inter Phy:  Kelly Cowan MD  [facility] Zip:  [place] [address]


SC:  C.[email or web address]


Report #:  [id number]


Technician:  SHARIF


Diagnosis:  ASYMPTOMATIC/HX BREAST CA


Service Date:  07/27/17


MNE:  [id number]


 


Ordering Dr: Malcolm Byrd M.D.


CC:  Malcolm Byrd M.D. CONF:  


DICTATED BY:  Kelly Cowan MD 








 MAMMOGRAPHY REPORT


  





BILATERAL DIGITAL SCREENING MAMMOGRAM TOMOSYNTHESIS WITH CAD: 7/27/2017


CLINICAL HISTORY: Asymptomatic. Personal history of breast cancer.  








TECHNIQUE:  Breast tomosynthesis in addition to standard 2D mammography was 

performed. Current study was also evaluated with a Computer Aided Detection (CAD

) system.  





COMPARISON: Comparison is made to exams dated:  7/25/2016 mammogram, 7/14/2015 

mammogram, 10/23/2014 mammogram, 10/22/2013 mammogram, 10/19/2012 mammogram, 

and 6/9/2011 mammogram - [facility].   





BREAST COMPOSITION:  There are scattered areas of fibroglandular density in 

both breasts.  





FINDINGS:  No suspicious masses, calcifications, or areas of architectural 

distortion are noted in either breast. There has been no significant interval 

change compared to prior exams.  There are stable postsurgical changes from 

bilateral lumpectomies.





IMPRESSION:  ACR BI-RADS CATEGORY 2: BENIGN


There is no mammographic evidence of malignancy. A 1 year screening mammogram 

is recommended.  The patient will receive written notification of the results.  








Approximately 10% of breast cancers are not detected with mammography. A 

negative mammographic report should not delay biopsy if a clinically suggestive 

mass is present.





Kelly Cowan M.D.          


ah/:7/27/2017 15:42:21  





Imaging Technologist: Salima DICKERSON(WANG)(M), [facility]


letter sent: Normal 1/2  


BI-RADS Code: ACR BI-RADS Category 2: Benign


__________________ ________________


Dictated by: Kelly Cowan MD


Signed by: Kelly Cowan MD





Assessment & Plan


Plan: Continue annual mammography.  She has a mammogram scheduled for July 2018.  Continue regular follow-up with her primary care physician, medical 

oncology, and her breast surgeon.  We asked her to return to our office in 1 

year.  She may call if she has any questions or concerns in the interim.





Total Time


In Follow-Up


I spent 20 minutes speaking to the patient and performing examination.  I spent 

15 minutes reviewing information in completing this note.





Copy To


Alejandra Man MD; Malcolm Byrd M.D.; Adilia Byrd M.D.





Problem Qualifiers





(1) Breast cancer:  


Breast location:  central portion of breast  Estrogen receptor status:  

negative  Patient sex:  female  Laterality:  left  Qualified Codes:  C50.112 - 

Malignant neoplasm of central portion of left female breast; Z17.1 - Estrogen 

receptor negative status [ER-]


(2) Stage I breast cancer:  


Estrogen receptor status:  negative  Laterality:  right  Qualified Codes:  

C50.911 - Malignant neoplasm of unspecified site of right female breast; Z17.1 

- Estrogen receptor negative status [ER-]

## 2018-01-25 ENCOUNTER — HOSPITAL ENCOUNTER (OUTPATIENT)
Dept: HOSPITAL 45 - C.MAMM | Age: 78
Discharge: HOME | End: 2018-01-25
Attending: INTERNAL MEDICINE
Payer: COMMERCIAL

## 2018-01-25 DIAGNOSIS — Z85.3: ICD-10-CM

## 2018-01-25 DIAGNOSIS — N64.9: Primary | ICD-10-CM

## 2018-01-25 DIAGNOSIS — Z90.89: ICD-10-CM

## 2018-01-25 NOTE — MAMMOGRAPHY REPORT
UNILATERAL RIGHT DIGITAL DIAGNOSTIC MAMMOGRAM TOMOSYNTHESIS WITH CAD: 1/25/2018

CLINICAL HISTORY: The patient had redness and pain involving her right nipple right after [place], 
which has completely resolved for approximately 4 weeks now.  She did not have any palpable lump, nip
ple discharge, or other complaints.  She currently has no symptoms.  History of right breast cancer s
tatus post lumpectomy.  





TECHNIQUE:  Breast tomosynthesis in addition to standard 2D mammography was performed. Current study 
was also evaluated with a Computer Aided Detection (CAD) system.  Right CC and MLO 2-D and tomosynthe
sis images were obtained.



COMPARISON: Comparison is made to exams dated:  7/27/2017 mammogram, 7/25/2016 mammogram, 1/25/2016 u
ltrasound, 1/25/2016 mammogram, 7/14/2015 mammogram, and 11/24/2014 mammogram - [facility].   



BREAST COMPOSITION:  There are scattered areas of fibroglandular density in the right breast.  



FINDINGS: There are no suspicious masses, calcifications, or areas of nonsurgical architectural disto
rtion noted in the right breast.  There has been no significant interval change compared to prior exa
ms.  There are stable post surgical changes in the right upper outer quadrant from prior lumpectomy, 
including stable density and architectural distortion at the surgical bed.  Scattered benign-appearin
g calcifications are not significantly changed.





IMPRESSION:  ACR BI-RADS CATEGORY 2: BENIGN

There is no mammographic evidence of malignancy in the right breast.  Recommend clinical follow-up fo
r right nipple symptoms which have resolved, and recommend routine bilateral screening mammograms whi
ch are due July 2018.  The patient has been verbally notified of the results.  





Approximately 10% of breast cancers are not detected with mammography. A negative mammographic report
 should not delay biopsy if a clinically suggestive mass is present.



Kelly Cowan M.D.          

ah/:1/25/2018 11:50:43  



Imaging Technologist: Salima MELTON)(MOIRA), [facility]

letter sent: Normal 1/2  

BI-RADS Code: ACR BI-RADS Category 2: Benign

## 2018-07-30 ENCOUNTER — HOSPITAL ENCOUNTER (OUTPATIENT)
Dept: HOSPITAL 45 - C.MAMM | Age: 78
Discharge: HOME | End: 2018-07-30
Attending: INTERNAL MEDICINE
Payer: COMMERCIAL

## 2018-07-30 DIAGNOSIS — Z12.31: Primary | ICD-10-CM

## 2018-07-30 DIAGNOSIS — Z85.3: ICD-10-CM

## 2018-07-31 NOTE — MAMMOGRAPHY REPORT
BILATERAL DIGITAL SCREENING MAMMOGRAM TOMOSYNTHESIS WITH CAD: 7/30/2018

CLINICAL HISTORY: Asymptomatic. Personal history of breast cancer.  





TECHNIQUE: The study was acquired using full field digital technology and interpreted from soft copy.
 Breast tomosynthesis in addition to standard 2D mammography was performed. Current study was also ev
aluated with a Computer Aided Detection (CAD) system.  



COMPARISON: Comparison is made to exams dated:  1/25/2018 mammogram, 7/27/2017 mammogram, 7/25/2016 m
ammogram, 1/25/2016 mammogram, 7/14/2015 mammogram, and 11/24/2014 stereotactic biopsy - [facility].   

BREAST COMPOSITION: There are scattered areas of fibroglandular density in both breasts.  



FINDINGS: There are possible faint clusters of microcalcifications in the lateral, middle to posterio
r left breast, thought to project superiorly based on the MLO view, for which additional spot magnifi
cation views are recommended.



There are stable postsurgical changes in each breast, denoting at the sites of prior lumpectomy.  Mil
d vascular calcifications bilaterally. No other suspicious mass, architectural distortion or cluster 
of microcalcifications is seen.  



IMPRESSION: ACR BI-RADS CATEGORY 0: INCOMPLETE EVALUATION: NEED ADDITIONAL IMAGING EVALUATION

The possible faint clusters of microcalcifications in the lateral left breast need additional evaluat
ion.  

The patient will be called to schedule an appointment.  





Some breast cancers are not detected with mammography. A negative mammographic report should not sabiha
y biopsy if a clinically suggestive mass is present.



Cheri Gill M.D.          

ay/:7/30/2018 17:14:06  



Imaging Technologist: Anna Marcial, [facility]

letter sent: Addl Imaging 0  

BI-RADS Code: ACR BI-RADS Category 0: Incomplete Evaluation: Need Additional Imaging Evaluation

## 2018-08-08 ENCOUNTER — HOSPITAL ENCOUNTER (OUTPATIENT)
Dept: HOSPITAL 45 - C.MAMM | Age: 78
Discharge: HOME | End: 2018-08-08
Attending: INTERNAL MEDICINE
Payer: COMMERCIAL

## 2018-08-08 DIAGNOSIS — R92.0: Primary | ICD-10-CM

## 2018-08-08 NOTE — MAMMOGRAPHY REPORT
UNILATERAL LEFT DIGITAL DIAGNOSTIC MAMMOGRAM: 8/8/2018

CLINICAL HISTORY: Callback from screening mammogram for left breast calcifications.  





TECHNIQUE: The study was acquired using full field digital technology and interpreted from soft copy.
  Spot magnification left CC and ML views were obtained.



COMPARISON: Comparison is made to exams dated:  7/30/2018 mammogram, 1/25/2018 mammogram, 7/27/2017 m
ammogram, 7/25/2016 mammogram, 1/25/2016 mammogram, and 7/14/2015 mammogram - [facility]
enter.   

BREAST COMPOSITION: There are scattered areas of fibroglandular density in left breast.  



FINDINGS: 

Spot magnification views of the left breast demonstrate a small 5 mm cluster of faint amorphous calci
fications within the left lateral posterior breast at approximately 3:00.  Additionally, there is a s
mall 6 mm cluster of punctate and amorphous calcifications within the left upper outer quadrant.  The
 calcifications were not clearly seen on prior mammograms therefore they are indeterminate and stereo
tactic biopsy of both clusters is recommended for further evaluation.



IMPRESSION: ACR BI-RADS CATEGORY 4: SUSPICIOUS

Two small clusters of calcifications in the left 3:00 breast and left upper outer quadrant.  The calc
ifications are indeterminate and stereotactic biopsy 2 is recommended for further evaluation.



A phone call was made to the physician's office to confirm faxed results were received.  

The patient has been verbally notified of the results.  She tentatively scheduled the biopsy before l
eaving the department.





Some breast cancers are not detected with mammography. A negative mammographic report should not sabiha
y biopsy if a clinically suggestive mass is present.



Kelly Cowan M.D.          

/:8/8/2018 12:05:25  



Imaging Technologist: Anna Marcial, [facility]

letter sent: Abnormal 4/5  

BI-RADS Code: ACR BI-RADS Category 4: Suspicious

## 2018-08-15 ENCOUNTER — HOSPITAL ENCOUNTER (OUTPATIENT)
Dept: HOSPITAL 45 - C.MAMM | Age: 78
Discharge: HOME | End: 2018-08-15
Attending: INTERNAL MEDICINE
Payer: COMMERCIAL

## 2018-08-15 DIAGNOSIS — D24.2: Primary | ICD-10-CM

## 2018-08-15 DIAGNOSIS — R92.0: ICD-10-CM

## 2018-08-15 NOTE — DISCHARGE INSTRUCTIONS
Discharge Instructions


Procedure


Procedure Date:


Aug 15, 2018.


Reason for visit:


Left Calcs X 2.





Discharge


Discharge Date:


Aug 15, 2018.


Discharge Diagnosis:


status post breast biopsy





Instructions


Activity Recommendations:  Additional Limitations (see below)


Return to School/Work:  no limitations


Recommended Home Diet:  No Limitations


Provider Instructions:





ACTIVITY RECOMMENDATIONS:





*  No lifting, pushing, pulling or exercising the affected side for three days.








RETURN TO SCHOOL/WORK:





*  You may return to work/school after the procedure, but do not perform any 

strenuous


   activities for 24 to 48 hours.








MEDICATIONS:





*  Tylenol (two 325 mg) every four to six hours if needed for mild pain (if not 

allergic to Tylenol).








DIET:





*  Resume previous diet.








SPECIAL CARE INSTRUCTIONS:





*  Keep biopsy site dry for 24 hours.  May shower after 24 hours, but do not 

soak (bathe)


   incision.





*  May remove Tegaderm (plastic patch) 24 hours after procedure





*  Leave the steri-strips on for one week.  Allow the steri-strips to fall off 

by themselves.  


   If not off after one week, you may remove them.  You may place a Bandaid


   crosswise over the strips, if desired.





*  Apply ice 10 minutes on and 10 minutes off as needed.





*  Wear a bra at bedtime to sleep more comfortably for 2-3 days.





*  Your referring physician should have the results after approximately 5 to 7 

business days.





*  Call for unusual bleeding, fever, drainage, etc or if you have any questions 

call


    [phone] during normal business hours or after hours call Dr Cowan, [phone].








FOLLOW UP VISIT:





Follow-up with Referring Physician as scheduled.





Allergies


Coded Allergies:  


     Ciprofloxacin (Verified  Allergy, Intermediate, HIVES, 5/13/17)


     Penicillins (Unverified  Allergy, Mild, RASH, 5/13/17)


Uncoded Allergies:  


     dermabond tape (Allergy, Severe, rash severe skin breakdown, 2/7/13)





Andria Song Recommendations:


 


Call your doctor if:





*  Temperature above 101 degrees


*  Pain not relieved by pain medicine ordered


*  There is increased drainage or redness from any incision


*  You have any unanswered questions or concerns.





Your Doctors Instructions noted above were prepared by provider Kelly Cowan.


Patient Signature Section:





 Patient Instructions Signature Page














Jami Hanley 











Patient (or Guardian) Signature/Date:____________________________________ I 

have read and understand the instructions given to me by my caregivers.








Caregiver/RN/Doctor Signature/Date:____________________________________











The above-named patient and/or guardian has received patient instructions on 

this date.





























+  Original Patient Signature Page (only) stays with chart.  Please make copy 

for patient.

## 2018-08-16 NOTE — MAMMOGRAPHY REPORT
MULTIPLE STEREOTACTIC GUIDED BIOPSIES LEFT BREAST: 8/15/2018

CLINICAL HISTORY: Two small clusters of calcifications in the left breast, in the left upper outer qu
adrant and 3:00 breast.  



PATIENT CONSENT: The procedure, risks, benefits, and alternatives of stereotactic biopsy with clip pl
acement were discussed with the patient, and verbal and written consent was obtained.  A timeout was 
performed immediately prior to the procedure.  



PROCEDURE DESCRIPTION: With stereotactic guidance, aseptic technique, and lidocaine as a local anesth
etic (1% lidocaine to anesthetize the skin and 1% lidocaine with epinephrine to anesthetize the deepe
r tissues), the clustered calcifications of concern in the left upper outer quadrant (site "a ") were
 sampled multiple times with a 9-gauge vacuum-assisted biopsy needle (Suros Eviva).  The path of appr
[facility] was lateral.  The specimen radiograph demonstrates calcifications to be present in the samples. 
 A dumbbell-shaped metallic marker clip was placed at the biopsy site.  



With stereotactic guidance, aseptic technique, and lidocaine as a local anesthetic (1% lidocaine to a
nesthetize the skin and 1% lidocaine with epinephrine to anesthetize the deeper tissues), the other s
mall cluster of calcifications in the left lateral breast at approximately 3:00 (site "B") was sample
d multiple times with a 9-gauge vacuum-assisted biopsy needle (Suros Eviva).  The path of approach wa
s lateral.  The specimen radiograph demonstrates calcifications to be present in the samples.  A meta
llic marker clip (T-shaped) was placed at the biopsy site.  



Direct pressure was applied at the biopsy site until hemostasis was achieved.  Postprocedure left CC 
and ML views were obtained to confirm clip placement.  The postprocedural mammograms show a dumbbell-
shaped biopsy marker clip at the site of the biopsied calcifications in the left upper outer quadrant
.  A T-shaped biopsy clip is seen at the site of the biopsied calcifications in the left 3:00 breast.
  No significant postbiopsy hematoma is seen.  The patient tolerated the procedure without complicati
on.  She was given wound care instructions.  





COMPARISON: Comparison is made to exams dated:  7/30/2018 mammogram, 7/25/2016 mammogram, 1/25/2016 m
ammogram, 7/14/2015 mammogram, 11/24/2014 mammogram, and 1/25/2018 mammogram - [facility].   







IMPRESSION: STEREOTACTIC GUIDED BIOPSY

Stereotactic biopsy of 2 clusters of calcifications in the left upper outer quadrant and left 3:00 br
east, with clip placement.  The patient will receive pathology results from her referring provider.



Kelly Cowan M.D.  

ah/:8/15/2018 15:12:06  



Imaging Technologist: Mariana Faust RT(R)(M), [facility]